# Patient Record
Sex: FEMALE | NOT HISPANIC OR LATINO | Employment: FULL TIME | URBAN - METROPOLITAN AREA
[De-identification: names, ages, dates, MRNs, and addresses within clinical notes are randomized per-mention and may not be internally consistent; named-entity substitution may affect disease eponyms.]

---

## 2017-01-30 ENCOUNTER — APPOINTMENT (OUTPATIENT)
Dept: RADIOLOGY | Facility: MEDICAL CENTER | Age: 57
End: 2017-01-30
Attending: ORTHOPAEDIC SURGERY
Payer: COMMERCIAL

## 2017-02-07 ENCOUNTER — APPOINTMENT (OUTPATIENT)
Dept: RADIOLOGY | Facility: MEDICAL CENTER | Age: 57
End: 2017-02-07
Attending: ORTHOPAEDIC SURGERY
Payer: COMMERCIAL

## 2017-02-07 DIAGNOSIS — S83.512A SPRAIN OF ANTERIOR CRUCIATE LIGAMENT OF LEFT KNEE, INITIAL ENCOUNTER: ICD-10-CM

## 2017-02-07 DIAGNOSIS — M75.122 COMPLETE ROTATOR CUFF TEAR OR RUPTURE OF LEFT SHOULDER, NOT SPECIFIED AS TRAUMATIC: ICD-10-CM

## 2017-02-07 PROCEDURE — 73221 MRI JOINT UPR EXTREM W/O DYE: CPT | Mod: LT

## 2017-02-07 PROCEDURE — 73721 MRI JNT OF LWR EXTRE W/O DYE: CPT | Mod: LT

## 2022-01-06 PROBLEM — M17.12 ARTHRITIS OF LEFT KNEE: Status: ACTIVE | Noted: 2022-01-06

## 2022-02-20 ENCOUNTER — HOSPITAL ENCOUNTER (EMERGENCY)
Facility: MEDICAL CENTER | Age: 62
End: 2022-02-21
Attending: EMERGENCY MEDICINE
Payer: COMMERCIAL

## 2022-02-20 ENCOUNTER — APPOINTMENT (OUTPATIENT)
Dept: RADIOLOGY | Facility: MEDICAL CENTER | Age: 62
End: 2022-02-20
Attending: EMERGENCY MEDICINE
Payer: COMMERCIAL

## 2022-02-20 DIAGNOSIS — I82.441 ACUTE DVT OF RIGHT TIBIAL VEIN (HCC): ICD-10-CM

## 2022-02-20 DIAGNOSIS — M79.89 PAIN AND SWELLING OF RIGHT LOWER EXTREMITY: ICD-10-CM

## 2022-02-20 DIAGNOSIS — M79.604 PAIN AND SWELLING OF RIGHT LOWER EXTREMITY: ICD-10-CM

## 2022-02-20 PROCEDURE — 99283 EMERGENCY DEPT VISIT LOW MDM: CPT

## 2022-02-20 ASSESSMENT — PAIN DESCRIPTION - PAIN TYPE: TYPE: ACUTE PAIN

## 2022-02-20 ASSESSMENT — PAIN DESCRIPTION - DESCRIPTORS: DESCRIPTORS: OTHER (COMMENT);SHARP

## 2022-02-20 NOTE — LETTER
[facility], EMERGENCY DEPT   [address], [place] [address]  Phone: Dept: [phone] - Fax:        [facility] Progress Report and Disability Certification  Date of Service: 2/20/2022   No Show:  No  Date / Time of Next Visit: 2/24/2022   Claim Information   Patient Name: Carly Clayton  Claim Number:     Employer:   [employer]  Date of Injury: 1/16/2022     Insurer / TPA: State Compensation Ins Fund ID / SSN:    Occupation: Registered Nurse Diagnosis: Diagnoses of Pain and swelling of right lower extremity and Acute DVT of right tibial vein (HCC) were pertinent to this visit.    Medical Information   Related to Industrial Injury?   Comments:Unclear ***   Subjective Complaints:  Increasing right lower extremity swelling and pain   Objective Findings: Improving DVT on ultrasound   Pre-Existing Condition(s):     Assessment:   Initial Visit    Status: Additional Care Required  Comments:Outpatient follow-up  Permanent Disability:  Comments:Unknown    Plan:   Comments:Outpatient follow-up and continue Xarelto    Diagnostics:   Comments:Ultrasound with nonocclusive tibial vein DVT    Comments:       Disability Information   Status: Released to Restricted Duty    From:  2/21/2022  Through: 2/24/2022 Restrictions are: Temporary   Physical Restrictions   Sitting:  Occasionally Standing:  Occasionally Stooping:    Bending:      Squatting:  Occasionally Walking:  Occasionally Climbing:  Occasionally Pushing:  Occasionally   Pulling:  Occasionally Other:    Reaching Above Shoulder (L):   Reaching Above Shoulder (R):       Reaching Below Shoulder (L):    Reaching Below Shoulder (R):      Not to exceed Weight Limits   Carrying(hrs):   Weight Limit(lb):   Lifting(hrs):   Weight  Limit(lb):     Comments:      Repetitive Actions   Hands: i.e. Fine Manipulations from Grasping:     Feet: i.e. Operating Foot Controls:     Driving / Operate Machinery:      Physician Name: Juan Ramirez Physician Signature: JUAN Fairchild D.O. e-Signature:  , Medical Director   Clinic Name / Location: [facility], EMERGENCY DEPT  [address] [place] [address]  [phone]     Clinic Phone Number: Dept: [phone]   Appointment Time:  Visit Start Time:    Check-In Time:  10:18 PM Visit Discharge Time:    Original-Treating Physician or Chiropractor    Page 2-Insurer/TPA    Page 3-Employer    Page 4-Employee

## 2022-02-20 NOTE — LETTER
[facility], EMERGENCY DEPT   [address], [place] [address]  Phone: Dept: [phone] - Fax:        [facility] Progress Report and Disability Certification  Date of Service: 2/20/2022   No Show:  No  Date / Time of Next Visit:     Claim Information   Patient Name: Carly Clayton  Claim Number:     Employer:    Date of Injury: 1/16/2022     Insurer / TPA: State Compensation Ins Fund ID / SSN: [id number]    Occupation: Registered Nurse Diagnosis: Diagnoses of Pain and swelling of right lower extremity and Acute DVT of right tibial vein (HCC) were pertinent to this visit.    Medical Information   Related to Industrial Injury?   ***   Subjective Complaints:      Objective Findings:     Pre-Existing Condition(s):     Assessment:        Status:    Permanent Disability:     Plan:      Diagnostics:      Comments:       Disability Information   Status:      From:     Through:   Restrictions are:     Physical Restrictions   Sitting:    Standing:    Stooping:    Bending:      Squatting:    Walking:    Climbing:    Pushing:      Pulling:    Other:    Reaching Above Shoulder (L):   Reaching Above Shoulder (R):       Reaching Below Shoulder (L):    Reaching Below Shoulder (R):      Not to exceed Weight Limits   Carrying(hrs):   Weight Limit(lb):   Lifting(hrs):   Weight  Limit(lb):     Comments:      Repetitive Actions   Hands: i.e. Fine Manipulations from Grasping:     Feet: i.e. Operating Foot Controls:     Driving / Operate Machinery:     Physician Name: Nino Ramirez Physician Signature:   e-Signature:  , Medical Director   Clinic Name / Location: [facility], EMERGENCY DEPT  [address] [place] [id number] [phone]     Clinic Phone Number: Dept: [phone]   Appointment Time:  Visit Start Time:    Check-In Time:  10:18 PM Visit Discharge Time:    Original-Treating Physician or Chiropractor     Page 2-Insurer/TPA    Page 3-Employer    Page 4-Employee

## 2022-02-20 NOTE — LETTER
[facility], EMERGENCY DEPT   [address], [place] [address]  Phone: Dept: [phone] - Fax:        [facility] Progress Report and Disability Certification  Date of Service: 2/20/2022   No Show:  No  Date / Time of Next Visit:     Claim Information   Patient Name: Carly Clayton  Claim Number:     Employer:    Date of Injury: 1/16/2022     Insurer / TPA: State Compensation Ins Fund ID / SSN: [id number]    Occupation: Registered Nurse Diagnosis: Diagnoses of Pain and swelling of right lower extremity and Acute DVT of right tibial vein (HCC) were pertinent to this visit.    Medical Information   Related to Industrial Injury?   ***   Subjective Complaints:      Objective Findings:     Pre-Existing Condition(s):     Assessment:        Status:    Permanent Disability:     Plan:      Diagnostics:      Comments:       Disability Information   Status:      From:     Through:   Restrictions are:     Physical Restrictions   Sitting:    Standing:    Stooping:    Bending:      Squatting:    Walking:    Climbing:    Pushing:      Pulling:    Other:    Reaching Above Shoulder (L):   Reaching Above Shoulder (R):       Reaching Below Shoulder (L):    Reaching Below Shoulder (R):      Not to exceed Weight Limits   Carrying(hrs):   Weight Limit(lb):   Lifting(hrs):   Weight  Limit(lb):     Comments:      Repetitive Actions   Hands: i.e. Fine Manipulations from Grasping:     Feet: i.e. Operating Foot Controls:     Driving / Operate Machinery:     Physician Name: Nino Ramirez Physician Signature:   e-Signature:  , Medical Director   Clinic Name / Location: [facility], EMERGENCY DEPT  [address] [place] [id number] [phone]     Clinic Phone Number: Dept: [phone]   Appointment Time:  Visit Start Time:    Check-In Time:  10:18 PM Visit Discharge Time:    Original-Treating Physician or Chiropractor     Page 2-Insurer/TPA    Page 3-Employer    Page 4-Employee

## 2022-02-21 VITALS
DIASTOLIC BLOOD PRESSURE: 84 MMHG | BODY MASS INDEX: 35.08 KG/M2 | WEIGHT: 210.54 LBS | HEIGHT: 65 IN | RESPIRATION RATE: 16 BRPM | OXYGEN SATURATION: 97 % | TEMPERATURE: 97.4 F | HEART RATE: 75 BPM | SYSTOLIC BLOOD PRESSURE: 150 MMHG

## 2022-02-21 PROCEDURE — 93971 EXTREMITY STUDY: CPT | Mod: RT

## 2022-02-21 NOTE — ED PROVIDER NOTES
ED Provider Note    CHIEF COMPLAINT  Chief Complaint   Patient presents with   • Leg Pain     Patient reports after COVID she was diagnosed with a DVT on her RLE about a week ago. Pt was placed on xarelto. Reports increase swelling and pain x 3 days ago.    • Leg Swelling        HPI    Primary care provider: Pcp Not In Computer   History obtained from: Patient  History limited by: None     Carly Clayton is a 61 y.o. female who presents to the ED complaining of increasing right lower extremity swelling and pain.  Patient was diagnosed on February 9, 2022 with occlusive thrombus of right posterior tibia and peroneal veins by ultrasound in [place], [place], and started on Xarelto which she has been taking.  She states that they believed she developed DVT due to recent COVID-19.  She has never had blood clots in the past.  She was doing well until she noticed increasing pain and swelling starting 3 days ago.  She also reports that her right lower leg feels numb.  There has been no fever.  She has been paying attention and there has been no blood in her stools or urine.  She would like a repeat ultrasound.  No other concerns at this time.    REVIEW OF SYSTEMS  Please see HPI for pertinent positives/negatives.     PAST MEDICAL HISTORY  Past Medical History:   Diagnosis Date   • Hypothyroidism         SURGICAL HISTORY  History reviewed. No pertinent surgical history.     SOCIAL HISTORY  Social History     Tobacco Use   • Smoking status: Never Smoker   • Smokeless tobacco: Never Used   Vaping Use   • Vaping Use: Never used   Substance and Sexual Activity   • Alcohol use: Not Currently     Comment: occ   • Drug use: Never   • Sexual activity: Not on file        FAMILY HISTORY  History reviewed. No pertinent family history.     CURRENT MEDICATIONS  Home Medications     Reviewed by Cristiane Yarbrough R.N. (Registered Nurse) on 02/20/22 at 2227  Med List Status: Partial   Medication Last Dose Status   Acetaminophen  "(TYLENOL PO)  Active   Ferrous Sulfate (IRON PO)  Active   HM LIDOCAINE PATCH EX  Active   HYDROcodone/acetaminophen (NORCO)  MG Tab  Active   IBUPROFEN PO  Active   levothyroxine (SYNTHROID) 150 MCG Tab  Active                 ALLERGIES  Allergies   Allergen Reactions   • Contrast Media With Iodine [Iodine]      CT CONTRAST DYE        PHYSICAL EXAM  VITAL SIGNS: /84   Pulse 75   Temp 36.3 °C (97.4 °F) (Temporal)   Resp 16   Ht 1.651 m (5' 5\")   Wt 95.5 kg (210 lb 8.6 oz)   SpO2 97%   BMI 35.04 kg/m²  @ITALO[[id number]::@     Pulse ox interpretation: 95% I interpret this pulse ox as normal     Constitutional: Well developed, well nourished, alert in no apparent distress, nontoxic appearance   HENT: No external signs of trauma, normocephalic   Eyes: No discharge, no icterus   Neck: No stridor   Cardiovascular: Strong distal pulses and good perfusion   Thorax & Lungs: No respiratory distress   Extremities: No cyanosis, minimal diffuse swelling of right lower extremity compared to the left with mild tenderness to palpation without warmth/crepitus/fluctuance/erythema/palpable cords, no gross deformity, intact distal pulses with brisk cap refill   Skin: Warm, dry, no pallor/cyanosis, no rash noted   Neuro: A/O times 3, no focal deficits noted   Psychiatric: Cooperative, normal mood and affect, normal judgement, appropriate for clinical situation        DIAGNOSTIC STUDIES / PROCEDURES        LABS  All labs reviewed by me.     No results found for this or any previous visit.     RADIOLOGY  The radiologist's interpretation of all radiological studies have been reviewed by me.     US-EXTREMITY VENOUS LOWER UNILAT RIGHT   Final Result             COURSE & MEDICAL DECISION MAKING  Nursing notes, VS, PMSFHx reviewed in chart.     Review of past medical records shows the patient was seen at [facility] ED on February 10, 2022 for right lower extremity pain and found to have DVT on ultrasound and started " on Xarelto..      Differential diagnoses considered include but are not limited to: Arthritis, bursitis, tendonitis, DVT/vascular occlusion, radiculopathy, strain/sprain, neuropathy       History and physical exam as above.  Ultrasound today shows nonocclusive partial thrombosis of the proximal to mid posterior tibial vein but otherwise negative study.  This is an improvement from her prior ultrasound performed at the outside hospital.  I discussed the findings with the patient.  This is a very pleasant well-appearing patient in no acute distress and nontoxic in appearance.  Her symptoms may be due to sequela from DVT.  I do not find clinical signs to suggest infection or compartment syndrome.  Patient was advised to continue with her Xarelto and has follow-up appointment.  Return to ED precautions were given.  Patient also noted to have incidental elevated blood pressure reading for which she can follow-up on outpatient basis for further management.  Patient verbalized understanding and agreed with plan of care with no further questions or concerns.      The patient is referred to a primary physician for blood pressure management, diabetic screening, and for all other preventative health concerns.       FINAL IMPRESSION  1. Pain and swelling of right lower extremity Active   2. Acute DVT of right tibial vein (HCC) Active          DISPOSITION  Patient will be discharged home in stable condition.       FOLLOW UP  Please follow-up with your doctor    Call in 1 day      [facility], Emergency Dept  [address] [facility] [address] [phone]    If symptoms worsen         OUTPATIENT MEDICATIONS  Discharge Medication List as of 2/21/2022 12:39 AM             Electronically signed by: Nino Ramirez D.O., 2/20/2022 10:49 PM      Portions of this record were made with voice recognition software.  Despite my review, spelling/grammar/context errors may still remain.  Interpretation of this chart  should be taken in this context.

## 2022-02-21 NOTE — ED NOTES
"Pt discharged to lobby via ambulatory with steady gait, AOx4. Pt in possession of belongings. Pt provided discharge education and information pertaining to medications and follow up appointments. Pt received copy of discharge instructions and verbalized understanding. /84   Pulse 75   Temp 36.3 °C (97.4 °F) (Temporal)   Resp 16   Ht 1.651 m (5' 5\")   Wt 95.5 kg (210 lb 8.6 oz)   SpO2 97%   BMI 35.04 kg/m²   "

## 2022-02-21 NOTE — ED TRIAGE NOTES
"Chief Complaint   Patient presents with   • Leg Pain     Patient reports after COVID she was diagnosed with a DVT on her RLE about a week ago. Pt was placed on xarelto. Reports increase swelling and pain x 3 days ago.    • Leg Swelling       62 yo F to triage for above complaint. GCS 15.    Pt is alert and oriented, speaking in full sentences, follows commands and responds appropriately to questions. NAD. Resp are even and unlabored.      Pt placed in lobby. Pt educated on triage process. Pt encouraged to alert staff for any changes.     Patient and staff wearing appropriate PPE    /86   Pulse 71   Temp 36.7 °C (98.1 °F) (Temporal)   Resp 16   Ht 1.651 m (5' 5\")   Wt 95.5 kg (210 lb 8.6 oz)   SpO2 95%   BMI 35.04 kg/m²   "

## 2022-05-19 ENCOUNTER — HOSPITAL ENCOUNTER (OUTPATIENT)
Dept: RADIOLOGY | Facility: MEDICAL CENTER | Age: 62
End: 2022-05-19
Payer: COMMERCIAL

## 2022-06-06 ENCOUNTER — HOSPITAL ENCOUNTER (OUTPATIENT)
Dept: RADIOLOGY | Facility: MEDICAL CENTER | Age: 62
End: 2022-06-06
Attending: ORTHOPAEDIC SURGERY
Payer: COMMERCIAL

## 2022-06-06 ENCOUNTER — APPOINTMENT (OUTPATIENT)
Dept: RADIOLOGY | Facility: MEDICAL CENTER | Age: 62
End: 2022-06-06

## 2022-06-06 DIAGNOSIS — M17.12 ARTHRITIS OF LEFT KNEE: ICD-10-CM

## 2022-06-06 PROCEDURE — 73700 CT LOWER EXTREMITY W/O DYE: CPT | Mod: LT

## 2022-06-08 ENCOUNTER — HOSPITAL ENCOUNTER (OUTPATIENT)
Dept: RADIOLOGY | Facility: MEDICAL CENTER | Age: 62
End: 2022-06-08
Attending: FAMILY MEDICINE
Payer: COMMERCIAL

## 2022-06-08 DIAGNOSIS — Z12.31 ENCOUNTER FOR MAMMOGRAM TO ESTABLISH BASELINE MAMMOGRAM: ICD-10-CM

## 2022-06-08 PROCEDURE — 77063 BREAST TOMOSYNTHESIS BI: CPT

## 2022-10-20 ENCOUNTER — APPOINTMENT (RX ONLY)
Dept: URBAN - METROPOLITAN AREA CLINIC 6 | Facility: CLINIC | Age: 62
Setting detail: DERMATOLOGY
End: 2022-10-20

## 2022-10-20 DIAGNOSIS — L72.8 OTHER FOLLICULAR CYSTS OF THE SKIN AND SUBCUTANEOUS TISSUE: ICD-10-CM

## 2022-10-20 DIAGNOSIS — L73.2 HIDRADENITIS SUPPURATIVA: ICD-10-CM | Status: INADEQUATELY CONTROLLED

## 2022-10-20 PROCEDURE — ? PRESCRIPTION

## 2022-10-20 PROCEDURE — ? ADDITIONAL NOTES

## 2022-10-20 PROCEDURE — ? INTRALESIONAL KENALOG

## 2022-10-20 PROCEDURE — 11900 INJECT SKIN LESIONS </W 7: CPT

## 2022-10-20 PROCEDURE — ? COUNSELING

## 2022-10-20 PROCEDURE — 99204 OFFICE O/P NEW MOD 45 MIN: CPT | Mod: 25

## 2022-10-20 RX ORDER — DOXYCYCLINE 100 MG/1
CAPSULE ORAL BID
Qty: 20 | Refills: 3 | Status: ERX | COMMUNITY
Start: 2022-10-20

## 2022-10-20 RX ADMIN — DOXYCYCLINE 1: 100 CAPSULE ORAL at 00:00

## 2022-10-20 ASSESSMENT — LOCATION DETAILED DESCRIPTION DERM
LOCATION DETAILED: LEFT MEDIAL POSTERIOR THIGH
LOCATION DETAILED: LEFT AXILLARY VAULT
LOCATION DETAILED: RIGHT MEDIAL POSTERIOR THIGH
LOCATION DETAILED: RIGHT AXILLARY VAULT
LOCATION DETAILED: RIGHT PERINEUM
LOCATION DETAILED: LEFT BUTTOCK

## 2022-10-20 ASSESSMENT — LOCATION SIMPLE DESCRIPTION DERM
LOCATION SIMPLE: RIGHT INNER THIGH
LOCATION SIMPLE: LEFT BUTTOCK
LOCATION SIMPLE: LEFT INNER THIGH
LOCATION SIMPLE: RIGHT PERINEUM
LOCATION SIMPLE: RIGHT AXILLARY VAULT
LOCATION SIMPLE: LEFT AXILLARY VAULT

## 2022-10-20 ASSESSMENT — LOCATION ZONE DERM
LOCATION ZONE: TRUNK
LOCATION ZONE: PERINEUM
LOCATION ZONE: AXILLAE
LOCATION ZONE: LEG

## 2022-10-20 NOTE — PROCEDURE: INTRALESIONAL KENALOG
Lot # For Kenalog (Optional): [id number]
Medical Necessity Clause: This procedure was medically necessary because the lesions that were treated were:
How Many Mls Were Removed From The 40 Mg/Ml (10ml) Vial When Preparing The Injectable Solution?: 0
Detail Level: Detailed
Administered By (Optional): Paul
Kenalog Preparation: Kenalog
Expiration Date For Kenalog (Optional): Feb 2024
Consent: The risks of atrophy were reviewed with the patient.
Concentration Of Kenalog Solution Injected (Mg/Ml): 10.0
Validate Note Data When Using Inventory: Yes
Include Z78.9 (Other Specified Conditions Influencing Health Status) As An Associated Diagnosis?: No
Total Volume (Ccs): 0.4

## 2022-10-20 NOTE — PROCEDURE: ADDITIONAL NOTES
Render Risk Assessment In Note?: no
Additional Notes: Present x many years\\nPt to continue hibiclens \\nStart [id number] for 10 days, can restart if flaring. If many flares can consider trial of longer therapy
Detail Level: Simple

## 2022-11-07 ENCOUNTER — TELEPHONE (OUTPATIENT)
Dept: HEALTH INFORMATION MANAGEMENT | Facility: OTHER | Age: 62
End: 2022-11-07

## 2022-12-20 ENCOUNTER — TELEPHONE (OUTPATIENT)
Dept: CARDIOLOGY | Facility: MEDICAL CENTER | Age: 62
End: 2022-12-20
Payer: COMMERCIAL

## 2022-12-20 NOTE — TELEPHONE ENCOUNTER
LVM asking if patient has seen another Cardiologist in the past or had any cardiac testing done outside of [facility] to call with information so that records can be requested prior to appointment. Scheduled with Dr. Saha on 1/12/23.

## 2023-01-12 ENCOUNTER — OFFICE VISIT (OUTPATIENT)
Dept: CARDIOLOGY | Facility: MEDICAL CENTER | Age: 63
End: 2023-01-12
Payer: COMMERCIAL

## 2023-01-12 VITALS
BODY MASS INDEX: 34.49 KG/M2 | HEART RATE: 60 BPM | SYSTOLIC BLOOD PRESSURE: 166 MMHG | HEIGHT: 65 IN | RESPIRATION RATE: 16 BRPM | OXYGEN SATURATION: 96 % | DIASTOLIC BLOOD PRESSURE: 78 MMHG | WEIGHT: 207 LBS

## 2023-01-12 DIAGNOSIS — Z01.810 PRE-OPERATIVE CARDIOVASCULAR EXAMINATION: ICD-10-CM

## 2023-01-12 PROBLEM — E66.9 CLASS 1 OBESITY: Status: ACTIVE | Noted: 2023-01-12

## 2023-01-12 PROBLEM — G47.30 SLEEP APNEA: Status: ACTIVE | Noted: 2023-01-12

## 2023-01-12 PROBLEM — E03.9 HYPOTHYROIDISM: Status: ACTIVE | Noted: 2023-01-12

## 2023-01-12 PROBLEM — E78.00 PURE HYPERCHOLESTEROLEMIA: Status: ACTIVE | Noted: 2023-01-12

## 2023-01-12 PROBLEM — Z86.16 HISTORY OF COVID-19: Status: ACTIVE | Noted: 2023-01-12

## 2023-01-12 PROBLEM — I82.401 RIGHT LEG DVT (HCC): Status: RESOLVED | Noted: 2022-01-01 | Resolved: 2023-01-12

## 2023-01-12 PROBLEM — Z86.718 HISTORY OF DVT (DEEP VEIN THROMBOSIS): Status: ACTIVE | Noted: 2023-01-12

## 2023-01-12 PROBLEM — I82.401 RIGHT LEG DVT (HCC): Status: ACTIVE | Noted: 2022-01-01

## 2023-01-12 LAB — EKG IMPRESSION: NORMAL

## 2023-01-12 PROCEDURE — 93000 ELECTROCARDIOGRAM COMPLETE: CPT | Performed by: INTERNAL MEDICINE

## 2023-01-12 PROCEDURE — 99202 OFFICE O/P NEW SF 15 MIN: CPT | Mod: 25 | Performed by: INTERNAL MEDICINE

## 2023-01-12 RX ORDER — LEVOTHYROXINE SODIUM 175 UG/1
175 TABLET ORAL
COMMUNITY

## 2023-01-12 NOTE — PROGRESS NOTES
CARDIOLOGY CONSULTATION NOTE      Date of Visit: 1/12/2023    Primary Care Provider: Leelee Gray M.D.    Patient Name: Carly Clayton  YOB: 1960  MRN: [id number]     Reason for Visit:   Preoperative cardiovascular evaluation, abnormal ECG     Patient Story:   Carly Clayton is a 62 year-old woman with a past medical history of hypothyroidism, hyperlipidemia, obesity, and prior DVT in the setting of COVID infection.  Briefly, she was undergoing a preoperative evaluation prior to left knee replacement surgery when her ECG at her PCPs office noted a possible prior anteroseptal infarct.  She was referred to Cardiology for further evaluation.    She presents today for consultation.  She states that she has no known history of prior MI or CVA.  She has not been able to exercise regularly or climb stairs since early 2022 due to bilateral leg pain from either osteoarthritis or her DVT.  However, in 2021 she was able to easily walk up stairs without limiting symptoms.  She also denies any known history of heart failure, diabetes, or significant kidney disease and she is a non-smoker.  Her blood pressure will sometimes be elevated when she is anxious, but she states that, at baseline, she has relatively normal blood pressures with sometimes an elevated MAP.  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  She denies any significant family history of coronary artery disease.     Medications and Allergies:     Current Outpatient Medications   Medication Sig Dispense Refill    Ferrous Sulfate (RA HIGH POTENCY IRON) 27 MG Tab Take  by mouth.      levothyroxine (SYNTHROID) 150 MCG Tab Take 150 mcg by mouth.      Lidocaine 1.8 % Patch Apply  topically.      HYDROcodone/acetaminophen (NORCO)  MG Tab TAKE 1 TABLET BY MOUTH FOUR TIMES DAILY AS NEEDED      IBUPROFEN PO Take  by mouth.      Acetaminophen (TYLENOL PO) Take  by mouth.      HM LIDOCAINE PATCH EX Apply  topically.      Ferrous Sulfate  (IRON PO) Take  by mouth.      levothyroxine (SYNTHROID) 150 MCG Tab Take 150 mcg by mouth every morning on an empty stomach.       No current facility-administered medications for this visit.     Allergies   Allergen Reactions    Shellfish Allergy      Other reaction(s): Angioedema    Iodine Rash     CT CONTRAST DYE  CT CONTRAST DYE      Medical Decision Making:   I personally reviewed and interpreted her ECG from her PCPs office and from our office today.  I would have interpreted her previous ECG as normal.  Her ECG today was normal.  We discussed that sometimes the R wave can make decreased in V2 and V3 due to lead placement issues, which is common in women with large breasts.  I do not believe her ECG demonstrates evidence of a clear prior myocardial infarction and she has no clinical history of myocardial infarction.  She otherwise has no significant risk factors for severe coronary artery disease and her RCRI risk score is 0 (low risk).  This predicts a 3% risk of a major perioperative cardiovascular event within 30 days of a low cardiovascular risk surgery.  However, he is not clear that any further testing or intervention would appreciably lower this risk and no further evaluation is recommended prior to total left knee arthroplasty.    Care Time  A total of 31 minutes was spent on this patient encounter.    Follow Up  As needed     Cardiac Studies and Procedures:   Electrophysiology  ECG (1/12/2023)  Sinus bradycardia, otherwise within normal limits, no evidence of prior myocardial infarction    ECG (10/13/2022)  Normal sinus rhythm, no clear evidence of prior microinfarction     Vital Signs:   There were no vitals taken for this visit.   BP Readings from Last 4 Encounters:   02/21/22 150/84   01/27/22 149/89     Wt Readings from Last 4 Encounters:   02/20/22 95.5 kg (210 lb 8.6 oz)   01/27/22 93.1 kg (205 lb 4 oz)   07/29/21 90.7 kg (200 lb)     There is no height or weight on file to calculate  BMI.    Laboratories:   Outside laboratories (10/14/2022)  A1c 5.5%   WBC 4.5  Hgb 14    Total 193    HDL 50    Glucose 96  Creatinine 0.78  GFR 86  Na 142  K 4.2     Physical Examination:   General: Well-appearing, no acute distress  Eyes: Extraocular movements intact, anicteric  Neck: Full range of motion, no jugular venous distension  Pulmonary: Normal respiratory effort, clear to auscultation bilaterally  Cardiovascular: Borderline bradycardic, regular rhythm, no murmurs or gallops appreciated  Gastrointestinal: Obese  Extremities: Warm and well perfused, no lower extremity edema  Neurological: Alert and oriented, no gross focal motor deficits  Psychiatric: Normal affect, normal judgment     Past History:   Past Medical History  The patient's past medical history was reviewed.  See HPI and self-reported patient medical history form for pertinent medical history to consultation.    Past Social History  The patient's social history was reviewed.  See [facility] self-reported patient medical history form for pertinent social history to consultation.    Past Family History  The patient's family history was reviewed.  See HPI self-reported patient medical history form for pertinent family history to consultation.    Review of Systems  A pertinent cardiac review of systems was performed and was otherwise unremarkable except as per HPI and self-reported patient medical history form.        Liu Saha MD, [facility]  Interventional Cardiology  [facility]  [address] [address]  Phone: [phone]  Fax: [phone]

## 2023-01-13 ENCOUNTER — TELEPHONE (OUTPATIENT)
Dept: CARDIOLOGY | Facility: MEDICAL CENTER | Age: 63
End: 2023-01-13
Payer: COMMERCIAL

## 2023-01-13 NOTE — TELEPHONE ENCOUNTER
Faxed office clinic notes from Dr. Saha on 1/12/23.    [facility]  Fax# [phone]  #[phone]

## 2023-01-26 ENCOUNTER — APPOINTMENT (RX ONLY)
Dept: URBAN - METROPOLITAN AREA CLINIC 6 | Facility: CLINIC | Age: 63
Setting detail: DERMATOLOGY
End: 2023-01-26

## 2023-01-26 DIAGNOSIS — L73.2 HIDRADENITIS SUPPURATIVA: ICD-10-CM | Status: INADEQUATELY CONTROLLED

## 2023-01-26 PROBLEM — M25.561 ACUTE PAIN OF RIGHT KNEE: Status: ACTIVE | Noted: 2022-01-06

## 2023-01-26 PROCEDURE — 99213 OFFICE O/P EST LOW 20 MIN: CPT

## 2023-01-26 PROCEDURE — ? COUNSELING

## 2023-01-26 PROCEDURE — ? ADDITIONAL NOTES

## 2023-01-26 ASSESSMENT — LOCATION ZONE DERM
LOCATION ZONE: PERINEUM
LOCATION ZONE: AXILLAE

## 2023-01-26 ASSESSMENT — LOCATION DETAILED DESCRIPTION DERM
LOCATION DETAILED: LEFT AXILLARY VAULT
LOCATION DETAILED: RIGHT AXILLARY VAULT
LOCATION DETAILED: RIGHT PERINEUM

## 2023-01-26 ASSESSMENT — LOCATION SIMPLE DESCRIPTION DERM
LOCATION SIMPLE: LEFT AXILLARY VAULT
LOCATION SIMPLE: RIGHT PERINEUM
LOCATION SIMPLE: RIGHT AXILLARY VAULT

## 2023-01-26 NOTE — PROCEDURE: ADDITIONAL NOTES
Render Risk Assessment In Note?: yes
Detail Level: Simple
Additional Notes: Pt states ILK and doxycycline do not help - not interested in additional tx with these at this time\\nDiscussed Humira. Pt to think about it. \\nPt is interested in surgical treatments, discussed plastic surgery referral. she will look at her insurance coverage\\n

## 2023-02-14 ENCOUNTER — HOSPITAL ENCOUNTER (OUTPATIENT)
Dept: RADIOLOGY | Facility: MEDICAL CENTER | Age: 63
End: 2023-02-14
Attending: ORTHOPAEDIC SURGERY
Payer: COMMERCIAL

## 2023-02-14 DIAGNOSIS — M17.12 ARTHRITIS OF LEFT KNEE: ICD-10-CM

## 2023-02-14 PROCEDURE — 73700 CT LOWER EXTREMITY W/O DYE: CPT | Mod: LT

## 2024-05-21 ENCOUNTER — OFFICE VISIT (OUTPATIENT)
Facility: LOCATION | Age: 64
End: 2024-05-21
Payer: COMMERCIAL

## 2024-05-21 DIAGNOSIS — H52.4 PRESBYOPIA: Primary | ICD-10-CM

## 2024-05-21 PROCEDURE — 92004 COMPRE OPH EXAM NEW PT 1/>: CPT | Performed by: OPTOMETRIST

## 2024-05-21 RX ORDER — DOXYCYCLINE HYCLATE 50 MG/1
50 MG CAPSULE, GELATIN COATED ORAL
Qty: 0 | Refills: 0 | Status: ACTIVE
Start: 2024-05-21

## 2024-05-21 ASSESSMENT — VISUAL ACUITY
OS: 20/20
OD: 20/20